# Patient Record
Sex: MALE | Race: WHITE | Employment: OTHER | URBAN - METROPOLITAN AREA
[De-identification: names, ages, dates, MRNs, and addresses within clinical notes are randomized per-mention and may not be internally consistent; named-entity substitution may affect disease eponyms.]

---

## 2021-07-02 ENCOUNTER — HOSPITAL ENCOUNTER (EMERGENCY)
Age: 71
Discharge: HOME | End: 2021-07-02
Payer: MEDICARE

## 2021-07-02 VITALS
SYSTOLIC BLOOD PRESSURE: 126 MMHG | OXYGEN SATURATION: 97 % | RESPIRATION RATE: 14 BRPM | TEMPERATURE: 97.8 F | WEIGHT: 230 LBS | BODY MASS INDEX: 32.2 KG/M2 | DIASTOLIC BLOOD PRESSURE: 85 MMHG | HEART RATE: 69 BPM | HEIGHT: 71 IN

## 2021-07-02 DIAGNOSIS — H10.33 ACUTE CONJUNCTIVITIS OF BOTH EYES, UNSPECIFIED ACUTE CONJUNCTIVITIS TYPE: Primary | ICD-10-CM

## 2021-07-02 PROCEDURE — 99283 EMERGENCY DEPT VISIT LOW MDM: CPT

## 2021-07-02 RX ORDER — ERYTHROMYCIN 5 MG/G
OINTMENT OPHTHALMIC ONCE
Status: DISCONTINUED | OUTPATIENT
Start: 2021-07-02 | End: 2021-07-03 | Stop reason: HOSPADM

## 2021-07-02 RX ORDER — METFORMIN HYDROCHLORIDE 500 MG/1
1 TABLET, EXTENDED RELEASE ORAL DAILY
COMMUNITY
Start: 2021-06-25

## 2021-07-02 RX ORDER — TETRACAINE HYDROCHLORIDE 5 MG/ML
1 SOLUTION OPHTHALMIC ONCE
Status: DISCONTINUED | OUTPATIENT
Start: 2021-07-02 | End: 2021-07-03 | Stop reason: HOSPADM

## 2021-07-02 RX ORDER — ATORVASTATIN CALCIUM 40 MG/1
1 TABLET, FILM COATED ORAL DAILY
COMMUNITY
Start: 2021-06-04

## 2021-07-02 RX ORDER — FUROSEMIDE 20 MG/1
1 TABLET ORAL DAILY
COMMUNITY
Start: 2021-07-02

## 2021-07-02 RX ORDER — ERYTHROMYCIN 5 MG/G
OINTMENT OPHTHALMIC
Qty: 3.5 G | Refills: 1 | Status: SHIPPED | OUTPATIENT
Start: 2021-07-02 | End: 2021-07-12

## 2021-07-02 ASSESSMENT — VISUAL ACUITY
OD: 20/40
OS: 20/30
OU: 20/20

## 2021-07-02 ASSESSMENT — PAIN DESCRIPTION - PAIN TYPE: TYPE: ACUTE PAIN

## 2021-07-02 ASSESSMENT — PAIN DESCRIPTION - LOCATION: LOCATION: EYE

## 2021-07-02 ASSESSMENT — PAIN DESCRIPTION - DESCRIPTORS: DESCRIPTORS: DISCOMFORT;ITCHING

## 2021-07-02 ASSESSMENT — PAIN DESCRIPTION - FREQUENCY: FREQUENCY: CONTINUOUS

## 2021-07-02 ASSESSMENT — PAIN DESCRIPTION - ORIENTATION: ORIENTATION: RIGHT;LEFT

## 2021-07-02 ASSESSMENT — PAIN SCALES - GENERAL: PAINLEVEL_OUTOF10: 6

## 2021-07-03 ASSESSMENT — TONOMETRY
OD_IOP_MMHG: 12
IOP_AUTOMATED: 1
OS_IOP_MMHG: 13

## 2021-07-03 ASSESSMENT — ENCOUNTER SYMPTOMS
VOMITING: 0
NAUSEA: 0
COLOR CHANGE: 0
COUGH: 0
SORE THROAT: 0
EYE REDNESS: 1
ABDOMINAL PAIN: 0
EYE DISCHARGE: 1
RHINORRHEA: 0
PHOTOPHOBIA: 1
SHORTNESS OF BREATH: 0

## 2021-07-03 NOTE — ED PROVIDER NOTES
[facility]          PHYSICIAN ASSISTANT NOTE       Date of evaluation: 7/2/2021    Chief Complaint     Eye Drainage (Bilateral eye pain and redness with weeping)      History of Present Illness     Estela Piper is a 72 y.o. male, with a history of hyperlipidemia and diabetes, who presents to the ED with complaints of bilateral eye soreness/grainy sensation, redness and tearing that started around 1:00 this afternoon, approximately 4 hours prior to arrival.  He does describe mild photophobia, no change in his vision. He does not wear contacts. Denies foreign body or chemical exposure. No URI type symptoms. The patient does report experiencing chills earlier this morning but no documented temperature. He denies cough and shortness of breath. No changes in urinary habits. He has noted some constipation without abdominal pain. He otherwise has no complaints. Review of Systems     Review of Systems   Constitutional: Positive for chills. Negative for fever. HENT: Negative for congestion, rhinorrhea and sore throat. Eyes: Positive for photophobia, discharge and redness. Negative for visual disturbance. Respiratory: Negative for cough and shortness of breath. Cardiovascular: Negative for chest pain and palpitations. Gastrointestinal: Negative for abdominal pain, nausea and vomiting. Genitourinary: Negative for dysuria, frequency and urgency. Musculoskeletal: Negative for arthralgias and myalgias. Skin: Negative for color change and rash. Neurological: Negative for dizziness, light-headedness and headaches. All other systems reviewed and are negative. Past Medical, Surgical, Family, and Social History     He has a past medical history of Diabetes mellitus, type 2 ([address] HLD (hyperlipidemia), and Obesity. He has no past surgical history on file. His family history is not on file. He reports that he has never smoked.  He has never used smokeless tobacco. He reports previous alcohol use. He reports that he does not use drugs. Medications     Previous Medications    ATORVASTATIN (LIPITOR) 40 MG TABLET    Take 1 tablet by mouth daily    FUROSEMIDE (LASIX) 20 MG TABLET    Take 1 tablet by mouth daily    METFORMIN (GLUCOPHAGE-XR) 500 MG EXTENDED RELEASE TABLET    Take 1 tablet by mouth daily    SERTRALINE (ZOLOFT) 50 MG TABLET    Take 1 tablet by mouth daily       Allergies     He has No Known Allergies. Physical Exam     INITIAL VITALS: BP: 139/71, Temp: 97.8 °F (36.6 °C), Pulse: 67, Resp: 18, SpO2: 97 %  Physical Exam  Vitals reviewed. Constitutional:       General: He is not in acute distress. Appearance: He is well-developed and overweight. HENT:      Head: Normocephalic and atraumatic. Mouth/Throat:      Mouth: Mucous membranes are moist.   Eyes:      General: Lids are normal. Lids are everted, no foreign bodies appreciated. Right eye: Discharge (tearing) present. No foreign body. Left eye: Discharge (tearing) present. No foreign body. Intraocular pressure: Right eye pressure is 12 mmHg. Left eye pressure is 13 mmHg. Measurements were taken using an automated tonometer. Extraocular Movements: Extraocular movements intact. Conjunctiva/sclera:      Right eye: Right conjunctiva is injected. Left eye: Left conjunctiva is injected. Pupils: Pupils are equal, round, and reactive to light. Right eye: No fluorescein uptake. Left eye: No fluorescein uptake. Slit lamp exam:     Right eye: Anterior chamber quiet. Left eye: Anterior chamber quiet. Neck:      Trachea: Phonation normal.   Cardiovascular:      Rate and Rhythm: Normal rate and regular rhythm. Heart sounds: No murmur heard. No friction rub. No gallop. Pulmonary:      Effort: Pulmonary effort is normal. No respiratory distress. Breath sounds: No wheezing, rhonchi or rales.    Abdominal:      General: There is no distension. Palpations: Abdomen is soft. Tenderness: There is no abdominal tenderness. Musculoskeletal:      Cervical back: Normal range of motion and neck supple. Skin:     General: Skin is warm and dry. Findings: No petechiae or rash. Neurological:      Mental Status: He is alert and oriented to person, place, and time. Psychiatric:         Mood and Affect: Mood and affect normal.         Behavior: Behavior normal.         RECENT VITALS:  BP: 126/85, Temp: 97.8 °F (36.6 °C), Pulse: 69, Resp: 14, SpO2: 97 %     ED Course     Nursing Notes, Past Medical Hx,Past Surgical Hx, Social Hx, Allergies, and Family Hx were reviewed. The patient was given the following medications:  Orders Placed This Encounter   Medications    DISCONTD: tetracaine (TETRAVISC) 0.5 % ophthalmic solution 1 drop    DISCONTD: fluorescein ophthalmic strip 1 mg    erythromycin (ROMYCIN) 5 MG/GM ophthalmic ointment     Sig: Apply 1/2 inch ribbon inside lower right and left eyelids every 6 hours for 5 days. Dispense:  3.5 g     Refill:  1    DISCONTD: erythromycin (ROMYCIN) ophthalmic ointment       CONSULTS:  None    MEDICAL DECISION MAKING / ASSESSMENT / Varghese Abdifatah is a 72 y.o. male presenting with red eyes associated with soreness/grainy sensation and increased tearing with no apparent triggers. He does report feeling chills this morning but has no other infectious type symptoms and no signs on exam.  He has normal pressures, cornea is clear, no fluorescein uptake. Visual acuity is 20/40 OD, 20/30 OS. The patient will be treated for unspecified conjunctivitis. He is prescribed erythromycin ointment and is also encouraged to use Zyrtec for possible allergic component given the bilateral nature. He is referred to [facility] for recheck and additional management if no improvement or return to the ED for any worsening symptoms.     This patient was also evaluated by the attending physician. All care plans were discussed and agreed upon. Clinical Impression     1. Acute conjunctivitis of both eyes, unspecified acute conjunctivitis type        Disposition     PATIENT REFERRED TO:  [facility]  [phone]  On 7/6/2021  if no improvement      DISCHARGE MEDICATIONS:  Discharge Medication List as of 7/2/2021 11:06 PM      START taking these medications    Details   erythromycin (ROMYCIN) 5 MG/GM ophthalmic ointment Apply 1/2 inch ribbon inside lower right and left eyelids every 6 hours for 5 days. , Disp-3.5 g, R-1, Print             DISPOSITION Decision To Discharge 07/02/2021 10:45:46 PM     Kristan Tai, [address]  07/03/21 [id number]